# Patient Record
Sex: FEMALE | Race: WHITE
[De-identification: names, ages, dates, MRNs, and addresses within clinical notes are randomized per-mention and may not be internally consistent; named-entity substitution may affect disease eponyms.]

---

## 2019-04-21 NOTE — NUR
PT D/C WITH D/C SUMMARY AND SCRIPTS. ALL QUESTIONS ANSWERED. PT AMBULATES TO 
REGISTRATION DESK WITH STEADY GAIT FOR D/C HOME. PT DENIES ANY OTHER NEEDS 
PERTAINING TO THIS VISIT.

## 2019-04-21 NOTE — NUR
PT IN GOWN IN [facility]. PT ATTACHED TO VS MACHINES AND CARDIAC MONITOR.  PT VSS. 
PT EDUCATED ON ER PROCESS AND VERBALIZES UNDERSTANDING. CALL LIGHT IS WITHIN 
REACH.

## 2020-02-06 ENCOUNTER — HOSPITAL ENCOUNTER (EMERGENCY)
Dept: HOSPITAL 8 - ED | Age: 45
Discharge: HOME | End: 2020-02-06
Payer: COMMERCIAL

## 2020-02-06 VITALS — HEIGHT: 65 IN | WEIGHT: 167.55 LBS | BODY MASS INDEX: 27.92 KG/M2

## 2020-02-06 VITALS — DIASTOLIC BLOOD PRESSURE: 65 MMHG | SYSTOLIC BLOOD PRESSURE: 127 MMHG

## 2020-02-06 DIAGNOSIS — F17.200: ICD-10-CM

## 2020-02-06 DIAGNOSIS — J10.1: Primary | ICD-10-CM

## 2020-02-06 LAB — FLUAV AG SPEC QL IA: POSITIVE

## 2020-02-06 PROCEDURE — 71045 X-RAY EXAM CHEST 1 VIEW: CPT

## 2020-02-06 PROCEDURE — 87400 INFLUENZA A/B EACH AG IA: CPT

## 2020-02-06 PROCEDURE — 99283 EMERGENCY DEPT VISIT LOW MDM: CPT

## 2020-06-19 ENCOUNTER — HOSPITAL ENCOUNTER (EMERGENCY)
Dept: HOSPITAL 8 - ED | Age: 45
Discharge: HOME | End: 2020-06-19
Payer: COMMERCIAL

## 2020-06-19 VITALS — DIASTOLIC BLOOD PRESSURE: 80 MMHG | SYSTOLIC BLOOD PRESSURE: 153 MMHG

## 2020-06-19 VITALS — HEIGHT: 65 IN | BODY MASS INDEX: 31.22 KG/M2 | WEIGHT: 187.39 LBS

## 2020-06-19 DIAGNOSIS — R10.13: ICD-10-CM

## 2020-06-19 DIAGNOSIS — R11.2: ICD-10-CM

## 2020-06-19 DIAGNOSIS — K29.00: Primary | ICD-10-CM

## 2020-06-19 DIAGNOSIS — Z90.89: ICD-10-CM

## 2020-06-19 DIAGNOSIS — F17.200: ICD-10-CM

## 2020-06-19 DIAGNOSIS — R94.31: ICD-10-CM

## 2020-06-19 LAB
ALBUMIN SERPL-MCNC: 3.9 G/DL (ref 3.4–5)
ALP SERPL-CCNC: 107 U/L (ref 45–117)
ALT SERPL-CCNC: 29 U/L (ref 12–78)
ANION GAP SERPL CALC-SCNC: 9 MMOL/L (ref 5–15)
BASOPHILS # BLD AUTO: 0.06 X10^3/UL (ref 0–0.1)
BASOPHILS NFR BLD AUTO: 1 % (ref 0–1)
BILIRUB SERPL-MCNC: 0.3 MG/DL (ref 0.2–1)
CALCIUM SERPL-MCNC: 9 MG/DL (ref 8.5–10.1)
CHLORIDE SERPL-SCNC: 109 MMOL/L (ref 98–107)
CREAT SERPL-MCNC: 0.96 MG/DL (ref 0.55–1.02)
EOSINOPHIL # BLD AUTO: 0.14 X10^3/UL (ref 0–0.4)
EOSINOPHIL NFR BLD AUTO: 1 % (ref 1–7)
ERYTHROCYTE [DISTWIDTH] IN BLOOD BY AUTOMATED COUNT: 18.3 % (ref 9.6–15.2)
HCG UR SG: 1.01 (ref 1–1.03)
LYMPHOCYTES # BLD AUTO: 2.28 X10^3/UL (ref 1–3.4)
LYMPHOCYTES NFR BLD AUTO: 20 % (ref 22–44)
MCH RBC QN AUTO: 24.1 PG (ref 27–34.8)
MCHC RBC AUTO-ENTMCNC: 31.2 G/DL (ref 32.4–35.8)
MCV RBC AUTO: 77.4 FL (ref 80–100)
MD: NO
MICROSCOPIC: (no result)
MONOCYTES # BLD AUTO: 0.49 X10^3/UL (ref 0.2–0.8)
MONOCYTES NFR BLD AUTO: 4 % (ref 2–9)
NEUTROPHILS # BLD AUTO: 8.25 X10^3/UL (ref 1.8–6.8)
NEUTROPHILS NFR BLD AUTO: 74 % (ref 42–75)
PLATELET # BLD AUTO: 405 X10^3/UL (ref 130–400)
PMV BLD AUTO: 8.8 FL (ref 7.4–10.4)
PROT SERPL-MCNC: 8.2 G/DL (ref 6.4–8.2)
RBC # BLD AUTO: 5.15 X10^6/UL (ref 3.82–5.3)

## 2020-06-19 PROCEDURE — 81001 URINALYSIS AUTO W/SCOPE: CPT

## 2020-06-19 PROCEDURE — 76700 US EXAM ABDOM COMPLETE: CPT

## 2020-06-19 PROCEDURE — 85025 COMPLETE CBC W/AUTO DIFF WBC: CPT

## 2020-06-19 PROCEDURE — 96374 THER/PROPH/DIAG INJ IV PUSH: CPT

## 2020-06-19 PROCEDURE — 96375 TX/PRO/DX INJ NEW DRUG ADDON: CPT

## 2020-06-19 PROCEDURE — 99285 EMERGENCY DEPT VISIT HI MDM: CPT

## 2020-06-19 PROCEDURE — 36415 COLL VENOUS BLD VENIPUNCTURE: CPT

## 2020-06-19 PROCEDURE — 96361 HYDRATE IV INFUSION ADD-ON: CPT

## 2020-06-19 PROCEDURE — 81025 URINE PREGNANCY TEST: CPT

## 2020-06-19 PROCEDURE — 80053 COMPREHEN METABOLIC PANEL: CPT

## 2020-06-19 PROCEDURE — 93005 ELECTROCARDIOGRAM TRACING: CPT

## 2020-06-19 PROCEDURE — 83690 ASSAY OF LIPASE: CPT

## 2020-06-19 NOTE — NUR
PT RESTING IN [facility], MEDICATED PER MAR, PAIN DECREASED ACCORDING TO PT, EKG 
OBTAINED, WCTM. WAITING FOR ADDITIONAL TESTING.

## 2020-06-19 NOTE — NUR
LATE ENTRY: FIRST CONTACT WITH PT. PT CHANGED INTO GOWN, SITTING ON GURNEY, 
GROANING AND YELLING OUT, UNABLE TO STAY STILL D/T PAIN AND DISCOMFORT IN 
ABDOMINAL REGION. STATES PAIN IS ACROSS THE LOWER REGIONS. PT CAME IN D/T PAIN 
BECOMING UNBEARABLE AND REPORTS VOMITTING X2DAYS. PT REPORTS STILL PASSING GAS 
AND HAVING BOWEL MOVEMENTS, PT WILL NOT LET RN PALPATE ABDOMEN, NO BLOOD NOTED 
IN STOOL OR VOMIT ACCORDING TO PT. PT DENIES ANY ABDOMINAL TRAUMA. WCTM.



PLACED ON BP/SPO2/ECG MONITORING. 



PT  AT .

-------------------------------------------------------------------------------

Addendum: 06/19/20 at 0641 by CHRISTINA

-------------------------------------------------------------------------------

PAIN IN EPIGASTIC, SUBSTERNAL REGION. NOT LOWER ABDOMEN

## 2020-06-21 ENCOUNTER — HOSPITAL ENCOUNTER (EMERGENCY)
Dept: HOSPITAL 8 - ED | Age: 45
Discharge: HOME | End: 2020-06-21
Payer: COMMERCIAL

## 2020-06-21 VITALS — WEIGHT: 184.75 LBS | HEIGHT: 65 IN | BODY MASS INDEX: 30.78 KG/M2

## 2020-06-21 VITALS — SYSTOLIC BLOOD PRESSURE: 122 MMHG | DIASTOLIC BLOOD PRESSURE: 73 MMHG

## 2020-06-21 DIAGNOSIS — J18.1: Primary | ICD-10-CM

## 2020-06-21 DIAGNOSIS — K59.00: ICD-10-CM

## 2020-06-21 DIAGNOSIS — E87.6: ICD-10-CM

## 2020-06-21 DIAGNOSIS — Z20.828: ICD-10-CM

## 2020-06-21 LAB
ALBUMIN SERPL-MCNC: 3.4 G/DL (ref 3.4–5)
ALP SERPL-CCNC: 92 U/L (ref 45–117)
ALT SERPL-CCNC: 28 U/L (ref 12–78)
ANION GAP SERPL CALC-SCNC: 9 MMOL/L (ref 5–15)
BASOPHILS # BLD AUTO: 0.04 X10^3/UL (ref 0–0.1)
BASOPHILS NFR BLD AUTO: 1 % (ref 0–1)
BILIRUB SERPL-MCNC: 0.3 MG/DL (ref 0.2–1)
CALCIUM SERPL-MCNC: 9.1 MG/DL (ref 8.5–10.1)
CHLORIDE SERPL-SCNC: 104 MMOL/L (ref 98–107)
CREAT SERPL-MCNC: 0.84 MG/DL (ref 0.55–1.02)
EOSINOPHIL # BLD AUTO: 0.12 X10^3/UL (ref 0–0.4)
EOSINOPHIL NFR BLD AUTO: 2 % (ref 1–7)
ERYTHROCYTE [DISTWIDTH] IN BLOOD BY AUTOMATED COUNT: 18.5 % (ref 9.6–15.2)
LYMPHOCYTES # BLD AUTO: 2.7 X10^3/UL (ref 1–3.4)
LYMPHOCYTES NFR BLD AUTO: 36 % (ref 22–44)
MCH RBC QN AUTO: 24.4 PG (ref 27–34.8)
MCHC RBC AUTO-ENTMCNC: 31.8 G/DL (ref 32.4–35.8)
MCV RBC AUTO: 76.6 FL (ref 80–100)
MD: (no result)
MICROSCOPIC: (no result)
MONOCYTES # BLD AUTO: 0.98 X10^3/UL (ref 0.2–0.8)
MONOCYTES NFR BLD AUTO: 13 % (ref 2–9)
NEUTROPHILS # BLD AUTO: 3.63 X10^3/UL (ref 1.8–6.8)
NEUTROPHILS NFR BLD AUTO: 49 % (ref 42–75)
PLATELET # BLD AUTO: 346 X10^3/UL (ref 130–400)
PMV BLD AUTO: 8.8 FL (ref 7.4–10.4)
PROT SERPL-MCNC: 7.7 G/DL (ref 6.4–8.2)
RBC # BLD AUTO: 4.93 X10^6/UL (ref 3.82–5.3)

## 2020-06-21 PROCEDURE — 74177 CT ABD & PELVIS W/CONTRAST: CPT

## 2020-06-21 PROCEDURE — 81001 URINALYSIS AUTO W/SCOPE: CPT

## 2020-06-21 PROCEDURE — 99285 EMERGENCY DEPT VISIT HI MDM: CPT

## 2020-06-21 PROCEDURE — 87086 URINE CULTURE/COLONY COUNT: CPT

## 2020-06-21 PROCEDURE — 96374 THER/PROPH/DIAG INJ IV PUSH: CPT

## 2020-06-21 PROCEDURE — 96375 TX/PRO/DX INJ NEW DRUG ADDON: CPT

## 2020-06-21 PROCEDURE — 85025 COMPLETE CBC W/AUTO DIFF WBC: CPT

## 2020-06-21 PROCEDURE — 80053 COMPREHEN METABOLIC PANEL: CPT

## 2020-06-21 PROCEDURE — 36415 COLL VENOUS BLD VENIPUNCTURE: CPT

## 2020-06-21 PROCEDURE — 83690 ASSAY OF LIPASE: CPT

## 2020-06-24 ENCOUNTER — HOSPITAL ENCOUNTER (EMERGENCY)
Dept: HOSPITAL 8 - ED | Age: 45
Discharge: HOME | End: 2020-06-24
Payer: COMMERCIAL

## 2020-06-24 VITALS — DIASTOLIC BLOOD PRESSURE: 66 MMHG | SYSTOLIC BLOOD PRESSURE: 123 MMHG

## 2020-06-24 VITALS — WEIGHT: 183.2 LBS | HEIGHT: 65 IN | BODY MASS INDEX: 30.52 KG/M2

## 2020-06-24 DIAGNOSIS — R10.30: Primary | ICD-10-CM

## 2020-06-24 DIAGNOSIS — R11.2: ICD-10-CM

## 2020-06-24 DIAGNOSIS — K56.7: ICD-10-CM

## 2020-06-24 LAB
ALBUMIN SERPL-MCNC: 3.4 G/DL (ref 3.4–5)
ALP SERPL-CCNC: 93 U/L (ref 45–117)
ALT SERPL-CCNC: 32 U/L (ref 12–78)
ANION GAP SERPL CALC-SCNC: 9 MMOL/L (ref 5–15)
BASOPHILS # BLD AUTO: 0.07 X10^3/UL (ref 0–0.1)
BASOPHILS NFR BLD AUTO: 1 % (ref 0–1)
BILIRUB SERPL-MCNC: 0.3 MG/DL (ref 0.2–1)
CALCIUM SERPL-MCNC: 9.4 MG/DL (ref 8.5–10.1)
CHLORIDE SERPL-SCNC: 106 MMOL/L (ref 98–107)
CREAT SERPL-MCNC: 1.04 MG/DL (ref 0.55–1.02)
EOSINOPHIL # BLD AUTO: 0.25 X10^3/UL (ref 0–0.4)
EOSINOPHIL NFR BLD AUTO: 2 % (ref 1–7)
ERYTHROCYTE [DISTWIDTH] IN BLOOD BY AUTOMATED COUNT: 18.7 % (ref 9.6–15.2)
LYMPHOCYTES # BLD AUTO: 3.41 X10^3/UL (ref 1–3.4)
LYMPHOCYTES NFR BLD AUTO: 30 % (ref 22–44)
MCH RBC QN AUTO: 24.2 PG (ref 27–34.8)
MCHC RBC AUTO-ENTMCNC: 31.4 G/DL (ref 32.4–35.8)
MCV RBC AUTO: 76.9 FL (ref 80–100)
MD: NO
MICROSCOPIC: (no result)
MONOCYTES # BLD AUTO: 0.97 X10^3/UL (ref 0.2–0.8)
MONOCYTES NFR BLD AUTO: 8 % (ref 2–9)
NEUTROPHILS # BLD AUTO: 6.79 X10^3/UL (ref 1.8–6.8)
NEUTROPHILS NFR BLD AUTO: 59 % (ref 42–75)
PLATELET # BLD AUTO: 370 X10^3/UL (ref 130–400)
PMV BLD AUTO: 8.8 FL (ref 7.4–10.4)
PROT SERPL-MCNC: 7.6 G/DL (ref 6.4–8.2)
RBC # BLD AUTO: 5.04 X10^6/UL (ref 3.82–5.3)

## 2020-06-24 PROCEDURE — 84703 CHORIONIC GONADOTROPIN ASSAY: CPT

## 2020-06-24 PROCEDURE — 99284 EMERGENCY DEPT VISIT MOD MDM: CPT

## 2020-06-24 PROCEDURE — 80053 COMPREHEN METABOLIC PANEL: CPT

## 2020-06-24 PROCEDURE — 85025 COMPLETE CBC W/AUTO DIFF WBC: CPT

## 2020-06-24 PROCEDURE — 96361 HYDRATE IV INFUSION ADD-ON: CPT

## 2020-06-24 PROCEDURE — 96374 THER/PROPH/DIAG INJ IV PUSH: CPT

## 2020-06-24 PROCEDURE — 83690 ASSAY OF LIPASE: CPT

## 2020-06-24 PROCEDURE — 81003 URINALYSIS AUTO W/O SCOPE: CPT

## 2020-06-24 PROCEDURE — 74022 RADEX COMPL AQT ABD SERIES: CPT

## 2020-06-24 PROCEDURE — 36415 COLL VENOUS BLD VENIPUNCTURE: CPT

## 2020-06-24 NOTE — NUR
THIS IS A 45 YO FEMALE COMING IN WITH C/O CONSTIPATION, LAST BM STATED 11 DAYS 
AGO. DIFFUSE SHARP ABD PAIN RATED 9/10, TENDER TO PALPATION. PATIENT WAS SEEN 
HERE 6/21 WITH C/O VOMITING, SENT HOME WITH ORDERS TO TAKE MIRALAX TO HELP WITH 
CONSTIPATION. NO SUCCESS AT HOME WITH MIRALAX. PATIENT STATES VOMITING SUBSIDED 
SINCE LAST TIME, AND CAME BACK TODAY. ALSO STATES "I WAS DIAGNOSED WITH 
PNEUMONIA AND HAVE BEEN ON ANTIBIOTICS SINCE SUNDAT. MY COVID TEST WAS 
NEGATIVE". SPO2 AND BP MONITORING IN PLACE, VSS, SOME DISTRESS NOTED R/T ABD 
PAIN AND CONSTIPATION. CALL LIGHT IN REACH

## 2021-01-18 ENCOUNTER — HOSPITAL ENCOUNTER (EMERGENCY)
Dept: HOSPITAL 8 - ED | Age: 46
Discharge: HOME | End: 2021-01-18
Payer: SELF-PAY

## 2021-01-18 VITALS — WEIGHT: 183.42 LBS | BODY MASS INDEX: 30.56 KG/M2 | HEIGHT: 65 IN

## 2021-01-18 VITALS — DIASTOLIC BLOOD PRESSURE: 73 MMHG | SYSTOLIC BLOOD PRESSURE: 126 MMHG

## 2021-01-18 DIAGNOSIS — R10.9: ICD-10-CM

## 2021-01-18 DIAGNOSIS — R55: ICD-10-CM

## 2021-01-18 DIAGNOSIS — K59.00: Primary | ICD-10-CM

## 2021-01-18 DIAGNOSIS — R11.2: ICD-10-CM

## 2021-01-18 LAB
ALBUMIN SERPL-MCNC: 4 G/DL (ref 3.4–5)
ALP SERPL-CCNC: 126 U/L (ref 45–117)
ALT SERPL-CCNC: 30 U/L (ref 12–78)
ANION GAP SERPL CALC-SCNC: 8 MMOL/L (ref 5–15)
BASOPHILS # BLD AUTO: 0.1 X10^3/UL (ref 0–0.1)
BASOPHILS NFR BLD AUTO: 1 % (ref 0–1)
BILIRUB SERPL-MCNC: 0.3 MG/DL (ref 0.2–1)
CALCIUM SERPL-MCNC: 9.6 MG/DL (ref 8.5–10.1)
CHLORIDE SERPL-SCNC: 106 MMOL/L (ref 98–107)
CREAT SERPL-MCNC: 1.06 MG/DL (ref 0.55–1.02)
EOSINOPHIL # BLD AUTO: 0.1 X10^3/UL (ref 0–0.4)
EOSINOPHIL NFR BLD AUTO: 1 % (ref 1–7)
ERYTHROCYTE [DISTWIDTH] IN BLOOD BY AUTOMATED COUNT: 17.5 % (ref 9.6–15.2)
LYMPHOCYTES # BLD AUTO: 3 X10^3/UL (ref 1–3.4)
LYMPHOCYTES NFR BLD AUTO: 28 % (ref 22–44)
MCH RBC QN AUTO: 25.1 PG (ref 27–34.8)
MCHC RBC AUTO-ENTMCNC: 32.2 G/DL (ref 32.4–35.8)
MD: NO
MICROSCOPIC: (no result)
MONOCYTES # BLD AUTO: 0.6 X10^3/UL (ref 0.2–0.8)
MONOCYTES NFR BLD AUTO: 6 % (ref 2–9)
NEUTROPHILS # BLD AUTO: 7.1 X10^3/UL (ref 1.8–6.8)
NEUTROPHILS NFR BLD AUTO: 65 % (ref 42–75)
PLATELET # BLD AUTO: 402 X10^3/UL (ref 130–400)
PMV BLD AUTO: 8.1 FL (ref 7.4–10.4)
PROT SERPL-MCNC: 8.2 G/DL (ref 6.4–8.2)
RBC # BLD AUTO: 4.91 X10^6/UL (ref 3.82–5.3)

## 2021-01-18 PROCEDURE — 96375 TX/PRO/DX INJ NEW DRUG ADDON: CPT

## 2021-01-18 PROCEDURE — 36415 COLL VENOUS BLD VENIPUNCTURE: CPT

## 2021-01-18 PROCEDURE — 84703 CHORIONIC GONADOTROPIN ASSAY: CPT

## 2021-01-18 PROCEDURE — 85025 COMPLETE CBC W/AUTO DIFF WBC: CPT

## 2021-01-18 PROCEDURE — 74177 CT ABD & PELVIS W/CONTRAST: CPT

## 2021-01-18 PROCEDURE — 80053 COMPREHEN METABOLIC PANEL: CPT

## 2021-01-18 PROCEDURE — 96376 TX/PRO/DX INJ SAME DRUG ADON: CPT

## 2021-01-18 PROCEDURE — 96361 HYDRATE IV INFUSION ADD-ON: CPT

## 2021-01-18 PROCEDURE — 99285 EMERGENCY DEPT VISIT HI MDM: CPT

## 2021-01-18 PROCEDURE — 81001 URINALYSIS AUTO W/SCOPE: CPT

## 2021-01-18 PROCEDURE — 83690 ASSAY OF LIPASE: CPT

## 2021-01-18 PROCEDURE — 96374 THER/PROPH/DIAG INJ IV PUSH: CPT

## 2021-01-18 NOTE — NUR
DR RUEDA NOTIFIED OF URINE SPECIMEN STATUS.  PLAN: GIVE ZOFRAN AND MORPHINE 
THEN HAVE PT ATTEMPT VOIDED SPECIMEN.

## 2021-01-18 NOTE — NUR
NS BOLUS INFUSED.  URGED PT TO PROVIDE URINE SPECIMEN. PT STATES "I CAN'T MOVE. 
 IT HURTS SO BAD!"  PT CURRENTLY IN LT LATERAL POSITION

## 2021-01-18 NOTE — NUR
SUPPORT STAFF: PATIENT CLEARED FOR DISCHARGE. NO NOTED ADDITIONAL NEEDS. 
PATIENT VERBALIZED UNDERSTANDING OF SELF CARE AND FOLLOW UP CARE AT HOME. 
AMBULATORY TO DISCHARGE DESK WTIHOUT COMPLICATIONS WITH BELONGINGS.

## 2021-08-02 NOTE — NUR
BS report to tuan VELASQUEZ pt care transferred at this time. She needs to complete her blood work that Dr. Bustillo had ordered, as these results pertain to this refill as well.